# Patient Record
Sex: FEMALE | Race: WHITE | NOT HISPANIC OR LATINO | Employment: OTHER | URBAN - METROPOLITAN AREA
[De-identification: names, ages, dates, MRNs, and addresses within clinical notes are randomized per-mention and may not be internally consistent; named-entity substitution may affect disease eponyms.]

---

## 2023-07-21 LAB
INR IN PPP BY COAGULATION ASSAY: 2.4 (ref 0.9–1.1)
PROTHROMBIN TIME (PT) IN PPP BY COAGULATION ASSAY: 27.2 SEC (ref 9.8–12.8)

## 2023-08-16 LAB
INR IN PPP BY COAGULATION ASSAY: 2.1 (ref 0.9–1.1)
PROTHROMBIN TIME (PT) IN PPP BY COAGULATION ASSAY: 24.3 SEC (ref 9.8–12.8)

## 2023-11-28 ENCOUNTER — OFFICE VISIT (OUTPATIENT)
Dept: CARDIOLOGY | Facility: CLINIC | Age: 78
End: 2023-11-28
Payer: MEDICARE

## 2023-11-28 VITALS
SYSTOLIC BLOOD PRESSURE: 136 MMHG | WEIGHT: 145 LBS | TEMPERATURE: 95.7 F | HEART RATE: 74 BPM | BODY MASS INDEX: 24.16 KG/M2 | DIASTOLIC BLOOD PRESSURE: 50 MMHG | HEIGHT: 65 IN

## 2023-11-28 DIAGNOSIS — R06.02 SHORTNESS OF BREATH ON EXERTION: ICD-10-CM

## 2023-11-28 DIAGNOSIS — I12.9 BENIGN HYPERTENSION WITH CKD (CHRONIC KIDNEY DISEASE) STAGE III (MULTI): ICD-10-CM

## 2023-11-28 DIAGNOSIS — Z95.0 PRESENCE OF CARDIAC PACEMAKER: ICD-10-CM

## 2023-11-28 DIAGNOSIS — I38 ENDOCARDITIS OF PROSTHETIC MITRAL VALVE (CMS-HCC): ICD-10-CM

## 2023-11-28 DIAGNOSIS — F01.50 VASCULAR DEMENTIA, UNSPECIFIED DEMENTIA SEVERITY, UNSPECIFIED WHETHER BEHAVIORAL, PSYCHOTIC, OR MOOD DISTURBANCE OR ANXIETY (MULTI): ICD-10-CM

## 2023-11-28 DIAGNOSIS — I48.0 PAROXYSMAL ATRIAL FIBRILLATION (MULTI): ICD-10-CM

## 2023-11-28 DIAGNOSIS — N18.30 BENIGN HYPERTENSION WITH CKD (CHRONIC KIDNEY DISEASE) STAGE III (MULTI): ICD-10-CM

## 2023-11-28 DIAGNOSIS — Z87.891 FORMER SMOKER: ICD-10-CM

## 2023-11-28 DIAGNOSIS — Z95.3 S/P AORTIC VALVE REPLACEMENT WITH BIOPROSTHETIC VALVE: ICD-10-CM

## 2023-11-28 DIAGNOSIS — R60.0 BILATERAL LEG EDEMA: ICD-10-CM

## 2023-11-28 DIAGNOSIS — T82.6XXA ENDOCARDITIS OF PROSTHETIC MITRAL VALVE (CMS-HCC): ICD-10-CM

## 2023-11-28 DIAGNOSIS — I50.33 ACUTE ON CHRONIC HEART FAILURE WITH PRESERVED EJECTION FRACTION (MULTI): Primary | ICD-10-CM

## 2023-11-28 DIAGNOSIS — Z95.3 S/P MITRAL VALVE REPLACEMENT WITH BIOPROSTHETIC VALVE: ICD-10-CM

## 2023-11-28 PROBLEM — E21.3 HYPERPARATHYROIDISM (MULTI): Status: ACTIVE | Noted: 2023-11-28

## 2023-11-28 PROBLEM — G95.9 CERVICAL MYELOPATHY (MULTI): Status: ACTIVE | Noted: 2023-11-28

## 2023-11-28 PROBLEM — M41.25 OTHER IDIOPATHIC SCOLIOSIS, THORACOLUMBAR REGION: Status: ACTIVE | Noted: 2023-11-28

## 2023-11-28 PROBLEM — M48.061 LUMBAR CANAL STENOSIS: Status: ACTIVE | Noted: 2023-11-28

## 2023-11-28 PROBLEM — G45.9 TIA (TRANSIENT ISCHEMIC ATTACK): Status: ACTIVE | Noted: 2023-11-28

## 2023-11-28 PROBLEM — I25.10 CAD (CORONARY ARTERY DISEASE): Status: ACTIVE | Noted: 2023-11-28

## 2023-11-28 PROBLEM — N30.90 CYSTITIS: Status: ACTIVE | Noted: 2023-11-28

## 2023-11-28 PROBLEM — E78.2 MIXED HYPERLIPIDEMIA: Status: ACTIVE | Noted: 2023-11-28

## 2023-11-28 PROBLEM — T46.4X5A ACE-INHIBITOR COUGH: Status: ACTIVE | Noted: 2023-11-28

## 2023-11-28 PROBLEM — E03.9 HYPOTHYROIDISM: Status: ACTIVE | Noted: 2023-11-28

## 2023-11-28 PROBLEM — I50.30 (HFPEF) HEART FAILURE WITH PRESERVED EJECTION FRACTION (MULTI): Status: ACTIVE | Noted: 2023-11-28

## 2023-11-28 PROBLEM — E55.9 VITAMIN D DEFICIENCY: Status: ACTIVE | Noted: 2023-11-28

## 2023-11-28 PROBLEM — R05.8 ACE-INHIBITOR COUGH: Status: ACTIVE | Noted: 2023-11-28

## 2023-11-28 PROBLEM — E61.1 IRON DEFICIENCY: Status: ACTIVE | Noted: 2023-11-28

## 2023-11-28 PROBLEM — K21.9 GERD (GASTROESOPHAGEAL REFLUX DISEASE): Status: ACTIVE | Noted: 2023-11-28

## 2023-11-28 PROBLEM — Z86.79 H/O SICK SINUS SYNDROME: Status: ACTIVE | Noted: 2023-11-28

## 2023-11-28 PROBLEM — I50.9 CHF (CONGESTIVE HEART FAILURE) (MULTI): Status: ACTIVE | Noted: 2023-11-28

## 2023-11-28 PROBLEM — K92.1 MELENA: Status: ACTIVE | Noted: 2023-11-28

## 2023-11-28 PROBLEM — F32.A ANXIETY AND DEPRESSION: Status: ACTIVE | Noted: 2023-11-28

## 2023-11-28 PROBLEM — H91.93 HEARING LOSS, BILATERAL: Status: ACTIVE | Noted: 2023-11-28

## 2023-11-28 PROBLEM — M13.0 POLYARTHRITIS: Status: ACTIVE | Noted: 2023-11-28

## 2023-11-28 PROBLEM — R39.15 URINARY URGENCY: Status: ACTIVE | Noted: 2023-11-28

## 2023-11-28 PROBLEM — I65.29 CAROTID STENOSIS, ASYMPTOMATIC: Status: ACTIVE | Noted: 2023-11-28

## 2023-11-28 PROBLEM — F41.9 ANXIETY AND DEPRESSION: Status: ACTIVE | Noted: 2023-11-28

## 2023-11-28 PROBLEM — G47.33 OBSTRUCTIVE SLEEP APNEA: Status: ACTIVE | Noted: 2023-11-28

## 2023-11-28 PROBLEM — I47.19 ATRIAL TACHYCARDIA (CMS-HCC): Status: ACTIVE | Noted: 2023-11-28

## 2023-11-28 PROCEDURE — 99215 OFFICE O/P EST HI 40 MIN: CPT | Performed by: INTERNAL MEDICINE

## 2023-11-28 PROCEDURE — 1036F TOBACCO NON-USER: CPT | Performed by: INTERNAL MEDICINE

## 2023-11-28 PROCEDURE — 3078F DIAST BP <80 MM HG: CPT | Performed by: INTERNAL MEDICINE

## 2023-11-28 PROCEDURE — 1160F RVW MEDS BY RX/DR IN RCRD: CPT | Performed by: INTERNAL MEDICINE

## 2023-11-28 PROCEDURE — 1159F MED LIST DOCD IN RCRD: CPT | Performed by: INTERNAL MEDICINE

## 2023-11-28 PROCEDURE — 3075F SYST BP GE 130 - 139MM HG: CPT | Performed by: INTERNAL MEDICINE

## 2023-11-28 RX ORDER — WARFARIN SODIUM 5 MG/1
5 TABLET ORAL ONCE
COMMUNITY
Start: 2016-08-01

## 2023-11-28 RX ORDER — SPIRONOLACTONE 25 MG/1
25 TABLET ORAL
COMMUNITY
End: 2024-02-12 | Stop reason: SDUPTHER

## 2023-11-28 RX ORDER — METOPROLOL SUCCINATE 50 MG/1
50 TABLET, EXTENDED RELEASE ORAL
COMMUNITY
Start: 2018-07-27

## 2023-11-28 RX ORDER — LEVOTHYROXINE SODIUM 100 UG/1
112 TABLET ORAL
COMMUNITY
Start: 2023-09-25

## 2023-11-28 RX ORDER — AMOXICILLIN 500 MG/1
250 CAPSULE ORAL EVERY 12 HOURS SCHEDULED
COMMUNITY
Start: 2021-11-22 | End: 2024-02-26 | Stop reason: WASHOUT

## 2023-11-28 RX ORDER — POTASSIUM CHLORIDE 1500 MG/1
20 TABLET, EXTENDED RELEASE ORAL DAILY
COMMUNITY
Start: 2022-03-28

## 2023-11-28 RX ORDER — TORSEMIDE 20 MG/1
40 TABLET ORAL DAILY
COMMUNITY
Start: 2022-03-25 | End: 2024-02-12 | Stop reason: SDUPTHER

## 2023-11-28 NOTE — PROGRESS NOTES
Patient:  Nemo Vegas  YOB: 1945  MRN: [id number]       Chief Complaint/Active Symptoms:       Nemo Vegas is a 78 y.o. female who returns today for cardiac follow-up.    Patient now at the [facility] in [place]. Went to  recently with HFpEF. Was treated earlier for MV endocarditis with 2  episodes of 4-6 weeks of antibiotic therapy. Now returns to our practice as they want a fingerstick PT/INR for her long-term for her warfarin management. Patient has been hospitalized twice for HFpEF at  hospital. Last echo in her chart is from 7/2023. Patients son who brought her to the clinic states one was done at  but that is not seen in the current record. Seen by Shady at [facility] in September and again in October     Patient had very mild edema on her legs when DC 11/19 per the family - progressively worsening since her DC. Her son states that they doubled her diuretic therapy post-discharge and added spironolactone. He is uncertain whether her LE edema has improved since those changes. He knows that a low sodium diet was ordered but is not sure whether it is being followed. The patient has support stockings but he states the staff do not put them on. They bought a ottoman for her to put her feet up but they are not sure with her dementia that she remembers to do so.     They have stopped her Calquence for her CLL ([facility]) due to some concerns of interfering with her antibiotics. There is some plan for possible restarting in January after seeing the ID specialist. She is currently on oral antibiotic therapy to complete for her MV endocarditis. Son states 2 different organisms grew - ID is managing her therapy.     No notes, labs, med list from SNF provided by family - a hand typed current med list seen by nursing staff. No order sheets from her SNF provided.     Review of Systems   Unable to perform ROS: Dementia   Patient has no complaints when asked other than leg edema and shortness of  breath.     Objective:     Vitals:    11/28/23 1106   BP: 136/50   Pulse: 74   Temp: 35.4 °C (95.7 °F)       Vitals:    11/28/23 1106   Weight: 65.8 kg (145 lb)       Allergies:     Allergies   Allergen Reactions    Vancomycin Anaphylaxis    Adhesive Tape-Silicones Unknown     SKIN IRRITANT    Penicillins Nausea Only and Unknown     N/V          Medications:     Current Outpatient Medications   Medication Instructions    amoxicillin (AMOXIL) 250 mg, oral, Every 12 hours scheduled    Klor-Con M20 20 mEq ER tablet 20 mEq, oral, Daily    levothyroxine (SYNTHROID, LEVOXYL) 100 mcg, oral, Daily RT    metoprolol succinate XL (TOPROL-XL) 50 mg, oral, Daily RT    spironolactone (ALDACTONE) 25 mg, oral, Daily RT    torsemide (DEMADEX) 20 mg, oral, 2 times daily    warfarin (COUMADIN) 5 mg, oral, Once       Physical Examination:   GENERAL:  Well developed, well nourished, in no acute distress.  HEENT: NC AT, EOMI with anicteric sclera  NECK:  Reduced ROM, no JVD at 90 degrees, no bruit.  CHEST:  Symmetric and nontender.  LUNGS:  Nonlabored respirations, diminished at the bases with mild scattered rhonchi and a few right basilar rales that mostly clear with cough.   HEART:  PMI is nonpalpable. RR with normal S1 and S2, no appreciable S3. Soft ROSELYN USB, mild diastolic decrescendo murmur LUSB and cardiac apex. Patient has bilateral 3+ waxy edema of the legs below the knees, trace posterior dependent edema above the knee on the right.   ABDOMEN: Soft, NT, ND without  bruits. Liver edge down 1 cm RCM  EXTREMITIES:  Warm with good color, no clubbing or cyanosis.  There is 3+ edema noted.  MUSCULOSKELETAL: arthritic changes - patient in wheelchair  NEURO/PSYCH:  Alert and oriented times one with approppriate behavior. Nonfocal motor examination, gait not assessed - in wheelchair  Lymph: No significant palpable lymphadenopathy  Skin: no rash or lesions on exposed skin or reported.    Lab:     CBC:   Lab Results   Component Value Date  "   WBC 9.6 07/05/2022    RBC 4.45 07/05/2022    HGB 12.7 07/05/2022    HCT 39.9 07/05/2022     07/05/2022        CMP:    Lab Results   Component Value Date     07/05/2022    K 3.8 07/05/2022     07/05/2022    CO2 30 07/05/2022    BUN 25 (H) 07/05/2022    CREATININE 0.87 07/05/2022    GLUCOSE 99 07/05/2022    CALCIUM 9.2 07/05/2022       Magnesium:    Lab Results   Component Value Date    MG 2.40 07/05/2022       Lipid Profile:    Lab Results   Component Value Date    TRIG 280 (H) 04/05/2021    HDL 50.0 04/05/2021       TSH:    Lab Results   Component Value Date    TSH 25.41 (H) 04/05/2021       BNP:   Lab Results   Component Value Date    BNP 97 07/05/2022        PT/INR:    Lab Results   Component Value Date    PROTIME 24.3 (H) 08/16/2023    INR 2.1 (H) 08/16/2023       HgBA1c:    Lab Results   Component Value Date    HGBA1C 5.4 01/04/2022       BMP:  Lab Results   Component Value Date     07/05/2022     08/11/2021     04/05/2021    K 3.8 07/05/2022    K 4.0 08/11/2021    K 4.3 04/05/2021     07/05/2022     08/11/2021    CL 99 04/05/2021    CO2 30 07/05/2022    CO2 31 08/11/2021    CO2 33 (H) 04/05/2021    BUN 25 (H) 07/05/2022    BUN 30 (H) 08/11/2021    BUN 29 (H) 04/05/2021    CREATININE 0.87 07/05/2022    CREATININE 1.37 (H) 08/11/2021    CREATININE 1.22 (H) 04/05/2021     Labs from 11/19/23 at  showed albumin 3, cr 1.11, K 3.9, bicarb 32. Hgb 9.2, hematocrit 29.2    Cardiac Enzymes:    No results found for: \"TROPHS\"    Hepatic Function Panel:    Lab Results   Component Value Date    ALKPHOS 82 07/05/2022    ALT 5 (L) 07/05/2022    AST 20 07/05/2022    PROT 6.6 07/05/2022    BILITOT 0.9 07/05/2022         Diagnostic Studies:     No echocardiogram results found for the past 12 months  Echo 7/2022 - EF 60-65%, AV gradient 9 mm Hg with trace AI, historically a 21 mm bioprosthetic AV, mitral bioprosthesis - 25 mm size without MR at that time. Trace TR seen. " "Paradoxical septal motion, diastolic filling indeterminate.     No nuclear medicine results found for the past 12 months    No valid procedures specified.    EKG:   No results found for: \"EKG\"    Radiology:     No orders to display   CXR dated 11/19/2023 at [facility] showed diffuse interstitial infiltrates and small bilateral pleural effusions. Mildly improved from 11/16/23 CXR AT       ASSESSMENT     Problem List Items Addressed This Visit       Benign hypertension with CKD (chronic kidney disease) stage III (CMS/HCC)    Paroxysmal atrial fibrillation (CMS/HCC)    Relevant Medications    metoprolol succinate XL (Toprol-XL) 50 mg 24 hr tablet    Presence of cardiac pacemaker    Shortness of breath on exertion    S/P aortic valve replacement with bioprosthetic valve    Vascular dementia (CMS/HCC)    (HFpEF) heart failure with preserved ejection fraction (CMS/HCC) - Primary    Relevant Medications    metoprolol succinate XL (Toprol-XL) 50 mg 24 hr tablet    Bilateral leg edema    S/P mitral valve replacement with bioprosthetic valve    Endocarditis of prosthetic mitral valve (CMS/HCC)    Relevant Medications    metoprolol succinate XL (Toprol-XL) 50 mg 24 hr tablet    Former smoker       PLAN     Acute on chronic HFpEF. Patient with signs of volume overload. Diuretic therapy being managed by her SNF staff / CNP but no order sheets or labs looking at renal function post-DC. Given last labs show some volume contraction with elevated bicab would start with compressive stockings or ACE wraps to mobilized the edema in her lower legs and make sure she is on a low sodium diet. Explained to the family that management of her diuretic therapy is best done by the medical staff at her SNF as we are removed, cannot see the patient fequently, and do not have access to labs and medication changes. Encouraged him to discuss status with the nursing manager and the CNP who made the initial adjustments in her therapy.   Edema - see " above  Mitral valve endocarditis / bioprosthetic AV and MV. Seeing ID - defer to them  Paroxysmal atrial fibrillation / warfarin anticoagulation. Patient wants to start back up her finger stick PT/INR and that is the only reason he came back to see us. Currently she gets labs drawn at her SNF and sent to [facility] labs and they have to wait 2-3 days for results. While we can recommend the PT/INR home machine - these would need to be managed by the physicians and staff at her SNF. Patients POA was noncompliant with management via the coumadin clinic at  prior to her being placed in an extended care facility - he would either not call in the labs or would not adjust her medications as instructed to do so. Will order the INR but will not manage the patients warfarin dose or refer to the  cardiology clinic. Patients POA was instructed that the medical staff at her SNF will need to continue to manage this medication.   Hypertension. BP controlled today.  Advanced dementia - patient oriented x 1. Son who is POA makes patient decisions.     Will follow prn. Patient being seen by EP here for her device management. As she will continue to be admitted to [facility] hospital when any issues suggested the establish with the cardiologist there. Will continue to provide support as needed. Limitations given her current situation discussed with the POA.

## 2023-12-11 ENCOUNTER — HOSPITAL ENCOUNTER (OUTPATIENT)
Dept: CARDIOLOGY | Facility: HOSPITAL | Age: 78
Discharge: HOME | End: 2023-12-11
Payer: MEDICARE

## 2023-12-11 DIAGNOSIS — Z95.0 CARDIAC PACEMAKER IN SITU: Primary | ICD-10-CM

## 2023-12-11 DIAGNOSIS — I49.5 SINOATRIAL NODE DYSFUNCTION (MULTI): ICD-10-CM

## 2023-12-11 DIAGNOSIS — Z95.0 CARDIAC PACEMAKER IN SITU: ICD-10-CM

## 2023-12-11 PROCEDURE — 93294 REM INTERROG EVL PM/LDLS PM: CPT | Performed by: INTERNAL MEDICINE

## 2023-12-11 PROCEDURE — 93296 REM INTERROG EVL PM/IDS: CPT

## 2024-02-08 RX ORDER — CALCIUM CARBONATE 300MG(750)
400 TABLET,CHEWABLE ORAL DAILY
COMMUNITY

## 2024-02-08 RX ORDER — BISMUTH SUBSALICYLATE 262 MG
2 TABLET,CHEWABLE ORAL DAILY
COMMUNITY
End: 2024-02-26 | Stop reason: WASHOUT

## 2024-02-12 ENCOUNTER — ANCILLARY PROCEDURE (OUTPATIENT)
Dept: CARDIOLOGY | Facility: CLINIC | Age: 79
End: 2024-02-12
Payer: MEDICARE

## 2024-02-12 ENCOUNTER — OFFICE VISIT (OUTPATIENT)
Dept: CARDIOLOGY | Facility: CLINIC | Age: 79
End: 2024-02-12
Payer: MEDICARE

## 2024-02-12 VITALS
DIASTOLIC BLOOD PRESSURE: 50 MMHG | TEMPERATURE: 97.2 F | HEIGHT: 65 IN | SYSTOLIC BLOOD PRESSURE: 122 MMHG | HEART RATE: 83 BPM | BODY MASS INDEX: 25.96 KG/M2 | WEIGHT: 155.8 LBS

## 2024-02-12 DIAGNOSIS — Z95.0 CARDIAC PACEMAKER IN SITU: ICD-10-CM

## 2024-02-12 DIAGNOSIS — E78.2 MIXED HYPERLIPIDEMIA: ICD-10-CM

## 2024-02-12 DIAGNOSIS — Z86.79 H/O SICK SINUS SYNDROME: ICD-10-CM

## 2024-02-12 DIAGNOSIS — T82.6XXA ENDOCARDITIS OF PROSTHETIC MITRAL VALVE (CMS-HCC): ICD-10-CM

## 2024-02-12 DIAGNOSIS — I49.5 SINOATRIAL NODE DYSFUNCTION (MULTI): ICD-10-CM

## 2024-02-12 DIAGNOSIS — Z95.3 S/P MITRAL VALVE REPLACEMENT WITH BIOPROSTHETIC VALVE: ICD-10-CM

## 2024-02-12 DIAGNOSIS — I65.23 ASYMPTOMATIC BILATERAL CAROTID ARTERY STENOSIS: ICD-10-CM

## 2024-02-12 DIAGNOSIS — I12.9 BENIGN HYPERTENSION WITH CKD (CHRONIC KIDNEY DISEASE) STAGE III (MULTI): ICD-10-CM

## 2024-02-12 DIAGNOSIS — Z95.3 S/P AORTIC VALVE REPLACEMENT WITH BIOPROSTHETIC VALVE: ICD-10-CM

## 2024-02-12 DIAGNOSIS — I48.0 PAROXYSMAL ATRIAL FIBRILLATION (MULTI): ICD-10-CM

## 2024-02-12 DIAGNOSIS — I38 ENDOCARDITIS OF PROSTHETIC MITRAL VALVE (CMS-HCC): ICD-10-CM

## 2024-02-12 DIAGNOSIS — Z95.0 PRESENCE OF CARDIAC PACEMAKER: ICD-10-CM

## 2024-02-12 DIAGNOSIS — I50.32 CHRONIC HEART FAILURE WITH PRESERVED EJECTION FRACTION (MULTI): ICD-10-CM

## 2024-02-12 DIAGNOSIS — I25.10 CORONARY ARTERY DISEASE INVOLVING NATIVE CORONARY ARTERY OF NATIVE HEART WITHOUT ANGINA PECTORIS: ICD-10-CM

## 2024-02-12 DIAGNOSIS — Z87.891 FORMER SMOKER: ICD-10-CM

## 2024-02-12 DIAGNOSIS — N18.30 BENIGN HYPERTENSION WITH CKD (CHRONIC KIDNEY DISEASE) STAGE III (MULTI): ICD-10-CM

## 2024-02-12 PROCEDURE — 93290 INTERROG DEV EVAL ICPMS IP: CPT | Performed by: INTERNAL MEDICINE

## 2024-02-12 PROCEDURE — 93000 ELECTROCARDIOGRAM COMPLETE: CPT | Performed by: INTERNAL MEDICINE

## 2024-02-12 PROCEDURE — 1036F TOBACCO NON-USER: CPT | Performed by: INTERNAL MEDICINE

## 2024-02-12 PROCEDURE — 93280 PM DEVICE PROGR EVAL DUAL: CPT | Performed by: INTERNAL MEDICINE

## 2024-02-12 PROCEDURE — 99215 OFFICE O/P EST HI 40 MIN: CPT | Performed by: INTERNAL MEDICINE

## 2024-02-12 PROCEDURE — 3074F SYST BP LT 130 MM HG: CPT | Performed by: INTERNAL MEDICINE

## 2024-02-12 PROCEDURE — 3078F DIAST BP <80 MM HG: CPT | Performed by: INTERNAL MEDICINE

## 2024-02-12 PROCEDURE — 1159F MED LIST DOCD IN RCRD: CPT | Performed by: INTERNAL MEDICINE

## 2024-02-12 RX ORDER — TORSEMIDE 20 MG/1
20 TABLET ORAL DAILY
Qty: 1 TABLET | Refills: 0 | OUTPATIENT
Start: 2024-02-12 | End: 2024-02-26 | Stop reason: SDUPTHER

## 2024-02-12 RX ORDER — POLYETHYLENE GLYCOL 3350 17 G/17G
17 POWDER, FOR SOLUTION ORAL DAILY
COMMUNITY

## 2024-02-12 RX ORDER — HYDROXYZINE HYDROCHLORIDE 25 MG/1
25 TABLET, FILM COATED ORAL EVERY 12 HOURS
COMMUNITY

## 2024-02-12 RX ORDER — SPIRONOLACTONE 25 MG/1
TABLET ORAL
Qty: 1 TABLET | Refills: 0 | OUTPATIENT
Start: 2024-02-12

## 2024-02-12 RX ORDER — LEVOTHYROXINE SODIUM 112 UG/1
112 TABLET ORAL
COMMUNITY
Start: 2023-12-22 | End: 2024-02-26 | Stop reason: WASHOUT

## 2024-02-12 RX ORDER — ALBUTEROL SULFATE 0.83 MG/ML
2.5 SOLUTION RESPIRATORY (INHALATION) EVERY 6 HOURS PRN
COMMUNITY
End: 2024-02-26 | Stop reason: WASHOUT

## 2024-02-12 RX ORDER — AMMONIUM LACTATE 12 G/100G
LOTION TOPICAL AS NEEDED
COMMUNITY
Start: 2024-01-24 | End: 2024-02-26 | Stop reason: WASHOUT

## 2024-02-12 RX ORDER — ACETAMINOPHEN 325 MG/1
650 TABLET ORAL EVERY 4 HOURS PRN
COMMUNITY
End: 2024-02-26 | Stop reason: WASHOUT

## 2024-02-12 RX ORDER — IBUPROFEN 100 MG/5ML
1000 SUSPENSION, ORAL (FINAL DOSE FORM) ORAL DAILY
COMMUNITY

## 2024-02-12 ASSESSMENT — ENCOUNTER SYMPTOMS
CONSTITUTIONAL NEGATIVE: 1
NEUROLOGICAL NEGATIVE: 1
RESPIRATORY NEGATIVE: 1
CARDIOVASCULAR NEGATIVE: 1

## 2024-02-12 NOTE — PROGRESS NOTES
Chief Complaint:   Follow-up     History Of Present Illness:    Nemo Vegas is a 78 y.o. female presenting with follow-up.  She has no arrhythmia symptoms device pocket is well-healed.    She is accompanied by her brother.  He reports that she is now in assisted living.  She underwent to IV courses antibiotics for endocarditis at the [facility].  Last echocardiogram from October 2023 showed no signs of endocarditis.  LV function is normal.    Last blood work reviewed.  Ordered to hold spironolactone until seen by cardiology written in chart.  Also decrease torsemide to 20 mg daily.  She has bilateral lower extremity edema.  Order for compression stockings written in chart.    Last Recorded Vitals:  Vitals:    02/12/24 1320   BP: 122/50   Pulse: 83   Temp: 36.2 °C (97.2 °F)         Past Medical History:  See list  He set up the remote monitor at her assisted living facility recently.  Discussed how the transmission gets to the [place] device clinic and pacer and that is then placed into epic    Past Surgical History:  See list  Social History:  She reports that she quit smoking about 15 years ago. Her smoking use included cigarettes. She has never used smokeless tobacco. She reports that she does not currently use alcohol. She reports that she does not currently use drugs.    Family History:  Family History   Problem Relation Name Age of Onset    Other (No pertinent family history) Mother      Other (Silent myocardial infarction) Father          Allergies:  Vancomycin, Adhesive tape-silicones, and Penicillins    Outpatient Medications:  Current Outpatient Medications   Medication Instructions    acalabrutinib (CALQUENCE ORAL) 100 mg, oral, 2 times daily, TAKE 1 TABLET TWICE DAILY    amoxicillin (AMOXIL) 250 mg, oral, Every 12 hours scheduled    cholecalciferol, vitamin D3, (VITAMIN D3 ORAL) 25 mcg, oral, Daily, Vitamin D3 25 MCG (1000 UT) Oral Capsule;  Take 1 capsule PO daily    Klor-Con M20 20 mEq ER tablet  20 mEq, oral, Daily    levothyroxine (SYNTHROID, LEVOXYL) 100 mcg, oral, Daily RT    magnesium oxide (MAG-OX) 400 mg, oral, Daily    metoprolol succinate XL (TOPROL-XL) 50 mg, oral, Daily RT    multivitamin (MULTIPLE VITAMINS ORAL) 1 tablet, oral, Daily    multivitamin tablet 2 tablets, oral, Daily, Vitamin C 1000 MG Oral Tablet;  TAKE 2 TABLETS DAILY    spironolactone (ALDACTONE) 25 mg, oral, Daily RT    torsemide (DEMADEX) 20 mg, oral, 2 times daily    warfarin (COUMADIN) 5 mg, oral, Once   Review of Systems   Constitutional: Negative.   Cardiovascular: Negative.    Respiratory: Negative.     Neurological: Negative.    All other systems reviewed and are negative.        Physical Exam:  Physical Exam  Cardiovascular:      Rate and Rhythm: Normal rate.      Pulses: Normal pulses.      Heart sounds: Normal heart sounds.   Pulmonary:      Effort: Pulmonary effort is normal.   Neurological:      General: No focal deficit present.      Mental Status: She is alert.            Last Labs:    Also see [facility] everywhere for blood work from October 2023    CBC -  Lab Results   Component Value Date    WBC 9.6 07/05/2022    HGB 12.7 07/05/2022    HCT 39.9 07/05/2022    MCV 90 07/05/2022     07/05/2022       CMP -  Lab Results   Component Value Date    CALCIUM 9.2 07/05/2022    PHOS 2.7 10/08/2020    PROT 6.6 07/05/2022    ALBUMIN 4.2 07/05/2022    AST 20 07/05/2022    ALT 5 (L) 07/05/2022    ALKPHOS 82 07/05/2022    BILITOT 0.9 07/05/2022       LIPID PANEL -   Lab Results   Component Value Date    CHOL 354 (H) 04/05/2021    TRIG 280 (H) 04/05/2021    HDL 50.0 04/05/2021    CHHDL 7.1 (A) 04/05/2021    LDLF 248 (H) 04/05/2021    VLDL 56 (H) 04/05/2021    NHDL 304 04/05/2021       RENAL FUNCTION PANEL -   Lab Results   Component Value Date    GLUCOSE 99 07/05/2022     07/05/2022    K 3.8 07/05/2022     07/05/2022    CO2 30 07/05/2022    ANIONGAP 12 07/05/2022    BUN 25 (H) 07/05/2022    CREATININE 0.87 07/05/2022     CALCIUM 9.2 07/05/2022    PHOS 2.7 10/08/2020    ALBUMIN 4.2 07/05/2022        Lab Results   Component Value Date    BNP 97 07/05/2022    HGBA1C 5.4 01/04/2022       Last Cardiology Tests:  ECG:    ECG today.  Possible ectopic atrial rhythm.  Appropriate pacing. Left axis deviation. Corrected QT interval 500 ms. Right bundle branch block. Left anterior fascicular block.     Device check today. Medtronic ADD RL 1 Adapta pacemaker. Estimate longevity device 8 years.  Less than 0.1 A. fib. 0 VT. 98% atrial pacing. 0.2% ventricular pacing.         Lab review: I have personally reviewed the laboratory result(s) see above    Assessment/Plan   Problem List Items Addressed This Visit       Benign hypertension with CKD (chronic kidney disease) stage III (CMS/[facility])    CAD (coronary artery disease)    Carotid stenosis, asymptomatic    H/O sick sinus syndrome    Mixed hyperlipidemia    Paroxysmal atrial fibrillation (CMS/[facility])    Presence of cardiac pacemaker    S/P aortic valve replacement with bioprosthetic valve    (HFpEF) heart failure with preserved ejection fraction (CMS/[facility])    S/P mitral valve replacement with bioprosthetic valve    Endocarditis of prosthetic mitral valve (CMS/[facility])    Former smoker    BMI 25.0-25.9,adult       Chronotropic incompetence, sinus node dysfunction, symptomatic bradycardia with near syncope. Status post pacemaker 2010 and generator change 2018. Chronic. Stable. Near syncope resolved after pacemaker implanted.  Medtronic ADD RL 1 Adapta pacemaker. Chronic. Stable.  Initiated remote monitors as patient has an assisted living.  Alternate remote monitors with device clinic paired with EP office visit  Chronic elevation in RV threshold. Adjusted amplitude. Capture management at monitoring only. Stable.  Has existing epicardial LV lead Requested device clinic to reevaluate lead data in past and evaluate for any LV lead by [employer] review, however, it remains unlisted. No other information found in  chart.  The patient's son and I discussed that there is a possibility with endocarditis that some infection may be present on a retained lead.  The patient and 2 family members discussed with me that she is not a candidate for extraction or surgery per her request and per evaluation at outside facility.  Near syncope. Chronic. Stable, with no recurrence after pacemaker implanted.  Paroxysmal atrial fibrillation and atrial tachycardia. Chronic. Stable. Reviewed medications. Continue warfarin. Continue metoprolol and magnesium. Continue medications.  Refills discussed  Prolonged QT.  Decrease torsemide.  History of aortic and mitral valve bioprostheses. Warfarin. High risk medication. Discussed anticoagulation  Hypertension, benign, chronic, controlled. Stable. Reviewed medications. Continue meds. Refills discussed. Also discussed compliance with medications.  Coronary artery disease. Chronic. Stable. Reviewed medications. Continue medications. Refills.  CHF. Stable. Reviewed medications.  Reviewed last echocardiogram.  Adjusted medication.  Further adjustment by cardiology in future.  Hyperlipidemia. Chronic. Stable.  Discussed refill CKD stage III. Chronic. Stable. Reviewed medications. Follows with renal.   Venous insufficiency. Stable. Chronic.  Order for compression stockings placed in chart.  Hypothyroidism on thyroid replacement. Chronic. Stable. Reviewed medication. On thyroid replacement.  Bilateral foot pain. Chronic. Follows with PCP  Overweight.  Dementia. Her brother closely follows her medications.  AHA recommendations for exercise, diet, and behavioral modification reviewed with pt.     The patient, family members, and I discussed the mechanism of arrhythmia, pacemaker, pacemaker check, ECG, device clinic, device longevity, diuretic, compression stockings, last available blood work in epic everywhere, discussion if and what medication refills needed, treatment options, risks, benefits, and  imponderables. American Heart Association lifestyle changes and behavioral modification discussed. All questions answered in detail. Counseling over 50% visit regarding above. Patient appreciative of care.        Jodee Duque MD

## 2024-02-12 NOTE — PATIENT INSTRUCTIONS
Wear compression daily on am and off pm  Hold spironolactone until seen by Dr Turcios in a few weeks.2 /26/24

## 2024-02-14 ENCOUNTER — PATIENT MESSAGE (OUTPATIENT)
Dept: CARDIOLOGY | Facility: CLINIC | Age: 79
End: 2024-02-14
Payer: MEDICARE

## 2024-02-14 DIAGNOSIS — R60.0 LOCALIZED EDEMA: Primary | ICD-10-CM

## 2024-02-19 ENCOUNTER — TELEPHONE (OUTPATIENT)
Dept: CARDIOLOGY | Facility: CLINIC | Age: 79
End: 2024-02-19
Payer: MEDICARE

## 2024-02-19 NOTE — TELEPHONE ENCOUNTER
I  called Jerel Chandler at [facility]. She was not available. I waited for several minutes and no one came to phone. I had to hang up as I was rounding with Dr Duque. Will try later.

## 2024-02-26 ENCOUNTER — OFFICE VISIT (OUTPATIENT)
Dept: CARDIOLOGY | Facility: CLINIC | Age: 79
End: 2024-02-26
Payer: MEDICARE

## 2024-02-26 ENCOUNTER — TELEPHONE (OUTPATIENT)
Dept: PRIMARY CARE | Facility: CLINIC | Age: 79
End: 2024-02-26

## 2024-02-26 VITALS
TEMPERATURE: 97.3 F | WEIGHT: 165 LBS | SYSTOLIC BLOOD PRESSURE: 140 MMHG | HEIGHT: 65 IN | HEART RATE: 72 BPM | BODY MASS INDEX: 27.49 KG/M2 | DIASTOLIC BLOOD PRESSURE: 50 MMHG

## 2024-02-26 DIAGNOSIS — Z95.3 S/P AORTIC VALVE REPLACEMENT WITH BIOPROSTHETIC VALVE: ICD-10-CM

## 2024-02-26 DIAGNOSIS — I38 ENDOCARDITIS OF PROSTHETIC MITRAL VALVE (CMS-HCC): ICD-10-CM

## 2024-02-26 DIAGNOSIS — R06.02 SHORTNESS OF BREATH ON EXERTION: ICD-10-CM

## 2024-02-26 DIAGNOSIS — I50.32 CHRONIC HEART FAILURE WITH PRESERVED EJECTION FRACTION (MULTI): Primary | ICD-10-CM

## 2024-02-26 DIAGNOSIS — Z95.3 S/P MITRAL VALVE REPLACEMENT WITH BIOPROSTHETIC VALVE: ICD-10-CM

## 2024-02-26 DIAGNOSIS — Z95.0 PRESENCE OF CARDIAC PACEMAKER: ICD-10-CM

## 2024-02-26 DIAGNOSIS — T82.6XXA ENDOCARDITIS OF PROSTHETIC MITRAL VALVE (CMS-HCC): ICD-10-CM

## 2024-02-26 DIAGNOSIS — I12.9 BENIGN HYPERTENSION WITH CKD (CHRONIC KIDNEY DISEASE) STAGE III (MULTI): ICD-10-CM

## 2024-02-26 DIAGNOSIS — N18.30 BENIGN HYPERTENSION WITH CKD (CHRONIC KIDNEY DISEASE) STAGE III (MULTI): ICD-10-CM

## 2024-02-26 DIAGNOSIS — Z87.891 FORMER SMOKER: ICD-10-CM

## 2024-02-26 PROCEDURE — 99214 OFFICE O/P EST MOD 30 MIN: CPT | Performed by: INTERNAL MEDICINE

## 2024-02-26 PROCEDURE — 1036F TOBACCO NON-USER: CPT | Performed by: INTERNAL MEDICINE

## 2024-02-26 PROCEDURE — 1159F MED LIST DOCD IN RCRD: CPT | Performed by: INTERNAL MEDICINE

## 2024-02-26 PROCEDURE — 3077F SYST BP >= 140 MM HG: CPT | Performed by: INTERNAL MEDICINE

## 2024-02-26 PROCEDURE — 3078F DIAST BP <80 MM HG: CPT | Performed by: INTERNAL MEDICINE

## 2024-02-26 PROCEDURE — 1160F RVW MEDS BY RX/DR IN RCRD: CPT | Performed by: INTERNAL MEDICINE

## 2024-02-26 ASSESSMENT — ENCOUNTER SYMPTOMS
CONFUSION: 1
FATIGUE: 1
WOUND: 1
ACTIVITY CHANGE: 1
SHORTNESS OF BREATH: 1
ABDOMINAL DISTENTION: 1
PALPITATIONS: 0

## 2024-02-26 NOTE — PROGRESS NOTES
Patient:  Nemo Vegas  YOB: 1945  MRN: [id number]       Chief Complaint/Active Symptoms:       Nemo Vegas is a 78 y.o. female who returns today for cardiac follow-up.    Patient here after a long hiatus. Has been admitted and seen at [facility] with concerns for endocarditis. Recent cultures and echos have been negative with normal heart function and normal mitral and aortic prosthetic valves.     Currently living at an Assisted [facility]. Goes to [facility] when ill.     Has laceration and drainage on right ankle that now is draining more purulent material     Review of Systems   Constitutional:  Positive for activity change and fatigue.   Respiratory:  Positive for shortness of breath.    Cardiovascular:  Positive for leg swelling. Negative for chest pain and palpitations.   Gastrointestinal:  Positive for abdominal distention.   Skin:  Positive for wound.   Psychiatric/Behavioral:  Positive for confusion.    All other systems reviewed and are negative.    Objective:     Vitals:    02/26/24 0958   BP: 140/50   Pulse: 72   Temp: 36.3 °C (97.3 °F)       Vitals:    02/26/24 0958   Weight: 74.8 kg (165 lb)       Allergies:     Allergies   Allergen Reactions    Vancomycin Anaphylaxis    Adhesive Tape-Silicones Unknown     SKIN IRRITANT    Penicillins Nausea Only and Unknown     N/V          Medications:     Current Outpatient Medications   Medication Instructions    ascorbic acid (VITAMIN C) 1,000 mg, oral, Daily    BISA-LAX, BISACODYL, RECT rectal    cholecalciferol, vitamin D3, (VITAMIN D3 ORAL) 25 mcg, oral, Daily, Vitamin D3 25 MCG (1000 UT) Oral Capsule;  Take 1 capsule PO daily    guaifenesin/phenylephrine HCl (MUCINEX COLD ORAL) oral    hydrOXYzine HCL (ATARAX) 25 mg, oral, Every 12 hours    Klor-Con M20 20 mEq ER tablet 20 mEq, oral, Daily    levothyroxine (SYNTHROID, LEVOXYL) 112 mcg, oral, Daily RT    mag hydrox/aluminum hyd/simeth (WANDA-LANTA ORAL) 30 mL, oral    magnesium  hydroxide (MILK OF MAGNESIA ORAL) oral    magnesium oxide (MAG-OX) 400 mg, oral, Daily    metoprolol succinate XL (TOPROL-XL) 50 mg, oral, Daily RT    multivitamin (MULTIPLE VITAMINS ORAL) 1 tablet, oral, Daily    polyethylene glycol (GLYCOLAX, MIRALAX) 17 g, oral, Daily    spironolactone (Aldactone) 25 mg tablet Hold  until patient sees Dr Turcios later this month    torsemide (DEMADEX) 20 mg, oral, Daily    warfarin (COUMADIN) 5 mg, oral, Once       Physical Examination:   GENERAL:  Well developed, well nourished, in no acute distress.  HEENT: NC AT, EOMI with anicteric sclera  NECK:  no JVD, no bruit.  CHEST:  Symmetric and nontender.  LUNGS:  Normal respiratory effort, diminished at right base. No rales  HEART:  RR, occ irreg with normal S1 and S2, no S3. Soft ROSELYN USB, no diastolic muirmur  ABDOMEN: Soft, NT, ND without palpable organomegaly or bruits  EXTREMITIES:  Warm with good color, no clubbing or cyanosis.  There is 4+ edema below the knees, posterior edema in the thighsnoted.  MUSCULOSKELETAL: OA changes  NEURO/PSYCH:  Alert and oriented times three with approppriate behavior and responses. Nonfocal motor examination with normal gait and ambulation  Lymph: No significant palpable lymphadenopathy  Skin: no rash or lesions on exposed skin or reported.    Lab:     CBC:   Lab Results   Component Value Date    WBC 9.6 07/05/2022    RBC 4.45 07/05/2022    HGB 12.7 07/05/2022    HCT 39.9 07/05/2022     07/05/2022        CMP:    Lab Results   Component Value Date     07/05/2022    K 3.8 07/05/2022     07/05/2022    CO2 30 07/05/2022    BUN 25 (H) 07/05/2022    CREATININE 0.87 07/05/2022    GLUCOSE 99 07/05/2022    CALCIUM 9.2 07/05/2022       Magnesium:    Lab Results   Component Value Date    MG 2.40 07/05/2022       Lipid Profile:    Lab Results   Component Value Date    TRIG 280 (H) 04/05/2021    HDL 50.0 04/05/2021       TSH:    Lab Results   Component Value Date    TSH 25.41 (H)  "04/05/2021       BNP:   Lab Results   Component Value Date    BNP 97 07/05/2022        PT/INR:    Lab Results   Component Value Date    PROTIME 24.3 (H) 08/16/2023    INR 2.1 (H) 08/16/2023       HgBA1c:    Lab Results   Component Value Date    HGBA1C 5.4 01/04/2022       BMP:  Lab Results   Component Value Date     07/05/2022     08/11/2021     04/05/2021    K 3.8 07/05/2022    K 4.0 08/11/2021    K 4.3 04/05/2021     07/05/2022     08/11/2021    CL 99 04/05/2021    CO2 30 07/05/2022    CO2 31 08/11/2021    CO2 33 (H) 04/05/2021    BUN 25 (H) 07/05/2022    BUN 30 (H) 08/11/2021    BUN 29 (H) 04/05/2021    CREATININE 0.87 07/05/2022    CREATININE 1.37 (H) 08/11/2021    CREATININE 1.22 (H) 04/05/2021       Cardiac Enzymes:    No results found for: \"TROPHS\"    Hepatic Function Panel:    Lab Results   Component Value Date    ALKPHOS 82 07/05/2022    ALT 5 (L) 07/05/2022    AST 20 07/05/2022    PROT 6.6 07/05/2022    BILITOT 0.9 07/05/2022     Diagnostic Studies:     No echocardiogram results found for the past 12 months  Echos from [facility] reviewed - no evidence of endocarditis on valves - normal LV function  No nuclear medicine results found for the past 12 months    No valid procedures specified.    EKG:   No results found for: \"EKG\"    Radiology:     No orders to display     ASSESSMENT     Problem List Items Addressed This Visit       Benign hypertension with CKD (chronic kidney disease) stage III (CMS/HCC)    Relevant Medications    torsemide 60 mg tablet    Presence of cardiac pacemaker    Shortness of breath on exertion    S/P aortic valve replacement with bioprosthetic valve    (HFpEF) heart failure with preserved ejection fraction (CMS/HCC) - Primary    Relevant Orders    Basic Metabolic Panel    B-Type Natriuretic Peptide    S/P mitral valve replacement with bioprosthetic valve    Endocarditis of prosthetic mitral valve (CMS/HCC)    Former smoker       PLAN   HFpEF - would increase " diuretic therapy - needs compression with either ASHLEIGH hose or ACE warps to mobilize fluid. Would check LFTS - consider hepatic or abdominal scan to look for ascites. If does not respond to diuretics and compression stockings - would consider addition of SGLT2 inhibitor jardiance.   Atrial fibrillation / Pacemaker - per EP  Bioprosthetic Aortic and mitral valves - recent endocarditis last year - last cultures negative  Edema - increase diuretics as above - compression stockings. Consider evalution for ascites / liver disease.   CLL - per Oncology - patients family referred to oncology for questions of [place].     Consider changing to [facility] for outpatient management given her admissions and location close to [facility]. Will follow and support as needed. Check labs in 7-10 days of increased dose of diuretic therapy.

## 2024-02-26 NOTE — TELEPHONE ENCOUNTER
Pharmacy left voicemail questioning Torsemide that was just sent over today. It states take 60 mg every day, but then under notes to pharmacy, the directions say decrease to 20 mg daily. . Pharmacy also stated they received a script by Dr. Selby with take 2 20 mg tablets daily. Please advise on how patient should be taking Rx.

## 2024-03-08 ENCOUNTER — APPOINTMENT (OUTPATIENT)
Dept: RADIOLOGY | Facility: HOSPITAL | Age: 79
End: 2024-03-08
Payer: MEDICARE

## 2024-03-08 ENCOUNTER — HOSPITAL ENCOUNTER (EMERGENCY)
Facility: HOSPITAL | Age: 79
Discharge: HOME | End: 2024-03-09
Attending: STUDENT IN AN ORGANIZED HEALTH CARE EDUCATION/TRAINING PROGRAM
Payer: MEDICARE

## 2024-03-08 ENCOUNTER — APPOINTMENT (OUTPATIENT)
Dept: CARDIOLOGY | Facility: HOSPITAL | Age: 79
End: 2024-03-08
Payer: MEDICARE

## 2024-03-08 DIAGNOSIS — J18.9 PNEUMONIA OF BOTH LOWER LOBES DUE TO INFECTIOUS ORGANISM: Primary | ICD-10-CM

## 2024-03-08 LAB
ALBUMIN SERPL BCP-MCNC: 3.7 G/DL (ref 3.4–5)
ALP SERPL-CCNC: 68 U/L (ref 33–136)
ALT SERPL W P-5'-P-CCNC: 11 U/L (ref 7–45)
ANION GAP SERPL CALC-SCNC: 12 MMOL/L (ref 10–20)
APTT PPP: 38 SECONDS (ref 27–38)
AST SERPL W P-5'-P-CCNC: 22 U/L (ref 9–39)
BASOPHILS # BLD AUTO: 0.05 X10*3/UL (ref 0–0.1)
BASOPHILS NFR BLD AUTO: 0.4 %
BILIRUB SERPL-MCNC: 0.6 MG/DL (ref 0–1.2)
BNP SERPL-MCNC: 499 PG/ML (ref 0–99)
BUN SERPL-MCNC: 43 MG/DL (ref 6–23)
CALCIUM SERPL-MCNC: 8.9 MG/DL (ref 8.6–10.3)
CARDIAC TROPONIN I PNL SERPL HS: 24 NG/L (ref 0–13)
CARDIAC TROPONIN I PNL SERPL HS: 25 NG/L (ref 0–13)
CHLORIDE SERPL-SCNC: 103 MMOL/L (ref 98–107)
CO2 SERPL-SCNC: 30 MMOL/L (ref 21–32)
CREAT SERPL-MCNC: 1.48 MG/DL (ref 0.5–1.05)
EGFRCR SERPLBLD CKD-EPI 2021: 36 ML/MIN/1.73M*2
EOSINOPHIL # BLD AUTO: 0.1 X10*3/UL (ref 0–0.4)
EOSINOPHIL NFR BLD AUTO: 0.9 %
ERYTHROCYTE [DISTWIDTH] IN BLOOD BY AUTOMATED COUNT: 18.8 % (ref 11.5–14.5)
GLUCOSE SERPL-MCNC: 84 MG/DL (ref 74–99)
HCT VFR BLD AUTO: 36.5 % (ref 36–46)
HGB BLD-MCNC: 11.3 G/DL (ref 12–16)
IMM GRANULOCYTES # BLD AUTO: 0.02 X10*3/UL (ref 0–0.5)
IMM GRANULOCYTES NFR BLD AUTO: 0.2 % (ref 0–0.9)
INR PPP: 2.5 (ref 0.9–1.1)
LYMPHOCYTES # BLD AUTO: 7.09 X10*3/UL (ref 0.8–3)
LYMPHOCYTES NFR BLD AUTO: 63.6 %
MCH RBC QN AUTO: 28 PG (ref 26–34)
MCHC RBC AUTO-ENTMCNC: 31 G/DL (ref 32–36)
MCV RBC AUTO: 90 FL (ref 80–100)
MONOCYTES # BLD AUTO: 0.39 X10*3/UL (ref 0.05–0.8)
MONOCYTES NFR BLD AUTO: 3.5 %
NEUTROPHILS # BLD AUTO: 3.49 X10*3/UL (ref 1.6–5.5)
NEUTROPHILS NFR BLD AUTO: 31.4 %
NRBC BLD-RTO: 0 /100 WBCS (ref 0–0)
PLATELET # BLD AUTO: 173 X10*3/UL (ref 150–450)
POTASSIUM SERPL-SCNC: 4 MMOL/L (ref 3.5–5.3)
PROT SERPL-MCNC: 6.7 G/DL (ref 6.4–8.2)
PROTHROMBIN TIME: 28.1 SECONDS (ref 9.8–12.8)
RBC # BLD AUTO: 4.04 X10*6/UL (ref 4–5.2)
SODIUM SERPL-SCNC: 141 MMOL/L (ref 136–145)
WBC # BLD AUTO: 11.1 X10*3/UL (ref 4.4–11.3)

## 2024-03-08 PROCEDURE — 36415 COLL VENOUS BLD VENIPUNCTURE: CPT

## 2024-03-08 PROCEDURE — 2500000001 HC RX 250 WO HCPCS SELF ADMINISTERED DRUGS (ALT 637 FOR MEDICARE OP): Performed by: STUDENT IN AN ORGANIZED HEALTH CARE EDUCATION/TRAINING PROGRAM

## 2024-03-08 PROCEDURE — 84484 ASSAY OF TROPONIN QUANT: CPT

## 2024-03-08 PROCEDURE — 73610 X-RAY EXAM OF ANKLE: CPT | Mod: LT

## 2024-03-08 PROCEDURE — 85610 PROTHROMBIN TIME: CPT

## 2024-03-08 PROCEDURE — 93005 ELECTROCARDIOGRAM TRACING: CPT

## 2024-03-08 PROCEDURE — 99284 EMERGENCY DEPT VISIT MOD MDM: CPT | Performed by: STUDENT IN AN ORGANIZED HEALTH CARE EDUCATION/TRAINING PROGRAM

## 2024-03-08 PROCEDURE — 71046 X-RAY EXAM CHEST 2 VIEWS: CPT

## 2024-03-08 PROCEDURE — 71046 X-RAY EXAM CHEST 2 VIEWS: CPT | Performed by: RADIOLOGY

## 2024-03-08 PROCEDURE — 70450 CT HEAD/BRAIN W/O DYE: CPT

## 2024-03-08 PROCEDURE — 80053 COMPREHEN METABOLIC PANEL: CPT

## 2024-03-08 PROCEDURE — 73610 X-RAY EXAM OF ANKLE: CPT | Mod: LEFT SIDE | Performed by: RADIOLOGY

## 2024-03-08 PROCEDURE — 85025 COMPLETE CBC W/AUTO DIFF WBC: CPT

## 2024-03-08 PROCEDURE — 70450 CT HEAD/BRAIN W/O DYE: CPT | Performed by: STUDENT IN AN ORGANIZED HEALTH CARE EDUCATION/TRAINING PROGRAM

## 2024-03-08 PROCEDURE — 99285 EMERGENCY DEPT VISIT HI MDM: CPT | Mod: 25

## 2024-03-08 PROCEDURE — 2500000002 HC RX 250 W HCPCS SELF ADMINISTERED DRUGS (ALT 637 FOR MEDICARE OP, ALT 636 FOR OP/ED): Performed by: STUDENT IN AN ORGANIZED HEALTH CARE EDUCATION/TRAINING PROGRAM

## 2024-03-08 PROCEDURE — 83880 ASSAY OF NATRIURETIC PEPTIDE: CPT

## 2024-03-08 PROCEDURE — 85730 THROMBOPLASTIN TIME PARTIAL: CPT

## 2024-03-08 RX ORDER — CLINDAMYCIN HYDROCHLORIDE 300 MG/1
300 CAPSULE ORAL 3 TIMES DAILY
Qty: 30 CAPSULE | Refills: 0 | Status: SHIPPED | OUTPATIENT
Start: 2024-03-08 | End: 2024-03-18

## 2024-03-08 RX ORDER — CEFUROXIME AXETIL 250 MG/1
500 TABLET ORAL ONCE
Status: COMPLETED | OUTPATIENT
Start: 2024-03-08 | End: 2024-03-08

## 2024-03-08 RX ORDER — QUETIAPINE FUMARATE 25 MG/1
50 TABLET, FILM COATED ORAL ONCE
Status: COMPLETED | OUTPATIENT
Start: 2024-03-08 | End: 2024-03-08

## 2024-03-08 RX ORDER — CEFUROXIME AXETIL 500 MG/1
500 TABLET ORAL 2 TIMES DAILY
Qty: 20 TABLET | Refills: 0 | Status: SHIPPED | OUTPATIENT
Start: 2024-03-08 | End: 2024-03-18

## 2024-03-08 RX ORDER — CLINDAMYCIN HYDROCHLORIDE 150 MG/1
450 CAPSULE ORAL ONCE
Status: COMPLETED | OUTPATIENT
Start: 2024-03-08 | End: 2024-03-08

## 2024-03-08 RX ADMIN — CEFUROXIME AXETIL 500 MG: 250 TABLET, FILM COATED ORAL at 21:15

## 2024-03-08 RX ADMIN — CLINDAMYCIN HYDROCHLORIDE 450 MG: 150 CAPSULE ORAL at 21:14

## 2024-03-08 RX ADMIN — QUETIAPINE FUMARATE 50 MG: 25 TABLET ORAL at 21:15

## 2024-03-08 ASSESSMENT — PAIN SCALES - GENERAL: PAINLEVEL_OUTOF10: 4

## 2024-03-08 ASSESSMENT — PAIN - FUNCTIONAL ASSESSMENT: PAIN_FUNCTIONAL_ASSESSMENT: 0-10

## 2024-03-08 ASSESSMENT — LIFESTYLE VARIABLES
EVER HAD A DRINK FIRST THING IN THE MORNING TO STEADY YOUR NERVES TO GET RID OF A HANGOVER: NO
HAVE YOU EVER FELT YOU SHOULD CUT DOWN ON YOUR DRINKING: NO
HAVE PEOPLE ANNOYED YOU BY CRITICIZING YOUR DRINKING: NO
EVER FELT BAD OR GUILTY ABOUT YOUR DRINKING: NO

## 2024-03-08 ASSESSMENT — PAIN DESCRIPTION - PAIN TYPE: TYPE: ACUTE PAIN

## 2024-03-08 ASSESSMENT — PAIN DESCRIPTION - LOCATION: LOCATION: LEG

## 2024-03-08 ASSESSMENT — COLUMBIA-SUICIDE SEVERITY RATING SCALE - C-SSRS
2. HAVE YOU ACTUALLY HAD ANY THOUGHTS OF KILLING YOURSELF?: NO
6. HAVE YOU EVER DONE ANYTHING, STARTED TO DO ANYTHING, OR PREPARED TO DO ANYTHING TO END YOUR LIFE?: NO
1. IN THE PAST MONTH, HAVE YOU WISHED YOU WERE DEAD OR WISHED YOU COULD GO TO SLEEP AND NOT WAKE UP?: NO

## 2024-03-08 NOTE — ED PROVIDER NOTES
HPI   Chief Complaint   Patient presents with    Leg Swelling       Patient is a 78-year-old female with history of dementia, CKD, CAD, P A-fib on warfarin, TIA, status post aortic and mitral valve replacement presenting to [facility] ED for complaints of bilateral leg swelling, left ankle pain, and fall.  Patient denies any active chest pain, shortness of breath, nausea, vomiting, diarrhea, abdominal pain, fever, chills, headache, change in vision, or weakness.  Patient walks at baseline with a walker.  Patient is coming from assisted living facility.                          Dhruv Coma Scale Score: 15                     Patient History   Past Medical History:   Diagnosis Date    Personal history of other diseases of the circulatory system 01/05/2017    History of atrial fibrillation    Personal history of other diseases of the circulatory system 02/12/2015    History of sick sinus syndrome    Personal history of other diseases of the digestive system 12/14/2021    History of melena    Personal history of other diseases of the musculoskeletal system and connective tissue 01/05/2017    History of spinal stenosis    Personal history of other diseases of the musculoskeletal system and connective tissue     History of muscle pain    Personal history of other endocrine, nutritional and metabolic disease 01/05/2017    History of hypothyroidism    Personal history of other specified conditions     History of diarrhea    Personal history of other specified conditions     History of dizziness    Personal history of other specified conditions     History of fatigue    Personal history of other specified conditions     History of edema    Presence of cardiac pacemaker 08/06/2018    History of cardiac pacemaker    Unspecified abnormalities of gait and mobility 01/05/2017    Gait abnormality     Past Surgical History:   Procedure Laterality Date    BRONCHOSCOPY  11/21/2022    Bronchoscopy    OTHER SURGICAL HISTORY   10/13/2021    Complete colonoscopy    OTHER SURGICAL HISTORY  10/13/2021    Knee replacement    OTHER SURGICAL HISTORY  10/13/2021    Pacemaker insertion    OTHER SURGICAL HISTORY  10/13/2021    Wrist surgery    OTHER SURGICAL HISTORY  10/13/2021    Nerve block anesthesia     Family History   Problem Relation Name Age of Onset    Other (No pertinent family history) Mother      Other (Silent myocardial infarction) Father       Social History     Tobacco Use    Smoking status: Former     Types: Cigarettes     Quit date: 2009     Years since quitting: 15.1    Smokeless tobacco: Never   Substance Use Topics    Alcohol use: Not Currently    Drug use: Not Currently       Physical Exam   ED Triage Vitals [03/08/24 1706]   Temperature Heart Rate Respirations BP   36.6 °C (97.9 °F) 71 (!) 22 (!) 138/41      Pulse Ox Temp Source Heart Rate Source Patient Position   95 % Temporal -- Lying      BP Location FiO2 (%)     Right arm --       Physical Exam  Constitutional:       Appearance: Normal appearance. She is normal weight.   HENT:      Head: Normocephalic and atraumatic.      Nose: Nose normal.      Mouth/Throat:      Mouth: Mucous membranes are moist.      Pharynx: Oropharynx is clear.   Eyes:      Extraocular Movements: Extraocular movements intact.      Conjunctiva/sclera: Conjunctivae normal.      Pupils: Pupils are equal, round, and reactive to light.   Cardiovascular:      Rate and Rhythm: Normal rate and regular rhythm.      Pulses: Normal pulses.      Heart sounds: Normal heart sounds.   Pulmonary:      Effort: Pulmonary effort is normal.      Breath sounds: Normal breath sounds.   Abdominal:      General: Abdomen is flat. Bowel sounds are normal.      Palpations: Abdomen is soft.   Musculoskeletal:         General: Normal range of motion.      Cervical back: Normal range of motion and neck supple.      Right lower leg: Edema present.      Left lower leg: Edema present.      Comments: 2+ bilateral BLE edema.    Skin:     General: Skin is warm and dry.      Capillary Refill: Capillary refill takes less than 2 seconds.   Neurological:      General: No focal deficit present.      Mental Status: She is alert and oriented to person, place, and time. Mental status is at baseline.   Psychiatric:         Mood and Affect: Mood normal.         Behavior: Behavior normal.         ED Course & MDM   ED Course as of 03/08/24 1915   Fri Mar 08, 2024   1854 Had extensive conversation with the patient's medical decision maker and brother.  We discussed her recent hospitalization, we discussed her recent blood work and repeat blood work today, her recent imaging and repeat imaging today.  He recommended obtaining a CAT scan of her head in light of her new CT finding recently.  This was ordered.  We discussed the pros and cons of treating the atelectasis versus pneumonia versus aspiration on x-ray and he did opt for treatment.  We discussed the pros and cons of abx, he agrees.  We discussed that so long as the CAT scan of his the head is unchanged from previous and her labs upon review are all stable from previous that he feels comfortable with her returning home to the assisted living facility. [JK]      ED Course User Index  [JK] Richard Decker,          Diagnoses as of 03/08/24 1915   Pneumonia of both lower lobes due to infectious organism       Medical Decision Making  Patient is a 78 y.o. female who presents to [facility] ED for Leg Swelling. On initial ED evaluation, patient found to be in no acute distress. Per HPI, concern to evaluate and treat for possible CHF exacerbation, possible left ankle injury.  Obtaining cardiac labs/diagnostics, including BNP, EKG, troponin series, chest x-ray, troponin series.  Obtaining x-ray films of the left ankle.  Left ankle x-ray negative for any acute osseous abnormality.  Lab work remarkable for possible CATHY, creatinine of 1.48, BNP of 499.  Given patient dementia history, talked with POA  regarding treatment options and escalation of care as needed, POA amenable.  Obtaining CT head without contrast to evaluate for any acute intracranial abnormality.  Patient currently ambulating successfully with walker.  Head CT did show subacute left occipital infarct, however was present on CT imaging a week ago at outside hospital.  X-ray left ankle negative for any acute fracture or subluxation.  X-ray chest did show small bilateral pleural effusion, bibasilar patchy opacities likely atelectasis.  Likely pneumonia process.  Patient to be discharged on outpatient regimen of cefuroxime and clindamycin.    Patient vitally stable at this time.  Patient disposition, discharged back to facility according to POA wishes.    Rx given for cefuroxime, clindamycin. Patient to follow up with PCP outpatient. Anticipatory guidance and return precautions provided.  Patient otherwise stable for discharge.          Procedure  Procedures     Nic Garner MD  Resident  03/08/24 4910

## 2024-03-09 VITALS
OXYGEN SATURATION: 96 % | DIASTOLIC BLOOD PRESSURE: 71 MMHG | HEART RATE: 74 BPM | WEIGHT: 180 LBS | TEMPERATURE: 97.9 F | BODY MASS INDEX: 28.93 KG/M2 | SYSTOLIC BLOOD PRESSURE: 136 MMHG | RESPIRATION RATE: 16 BRPM | HEIGHT: 66 IN

## 2024-03-09 NOTE — DISCHARGE INSTRUCTIONS
Please return to closest ED if develop any chest pain, shortness of breath, dizziness, or instability.

## 2024-03-09 NOTE — ED NOTES
0896 Spoke to Yu Nurse from [facility] and said patient was sent there after 6 and she had said they don't accept patients after 6 because there is no nurse. I faxed over after visit summary and summary of care to       Libertad Sánchez, EMT  03/09/24 0840

## 2024-03-12 LAB
ATRIAL RATE: 73 BPM
PR INTERVAL: 256 MS
Q ONSET: 181 MS
QRS COUNT: 12 BEATS
QRS DURATION: 156 MS
QT INTERVAL: 466 MS
QTC CALCULATION(BAZETT): 513 MS
QTC FREDERICIA: 497 MS
R AXIS: -66 DEGREES
T AXIS: 18 DEGREES
T OFFSET: 414 MS
VENTRICULAR RATE: 73 BPM

## 2024-03-25 ENCOUNTER — APPOINTMENT (OUTPATIENT)
Dept: CARDIOLOGY | Facility: CLINIC | Age: 79
End: 2024-03-25
Payer: MEDICARE

## 2024-05-13 ENCOUNTER — APPOINTMENT (OUTPATIENT)
Dept: CARDIOLOGY | Facility: CLINIC | Age: 79
End: 2024-05-13
Payer: MEDICARE

## 2024-05-20 ENCOUNTER — HOSPITAL ENCOUNTER (OUTPATIENT)
Dept: CARDIOLOGY | Facility: HOSPITAL | Age: 79
Discharge: HOME | End: 2024-05-20
Payer: MEDICARE

## 2024-05-20 DIAGNOSIS — Z86.79 H/O SICK SINUS SYNDROME: ICD-10-CM

## 2024-05-20 PROCEDURE — 93294 REM INTERROG EVL PM/LDLS PM: CPT | Performed by: INTERNAL MEDICINE

## 2024-05-20 PROCEDURE — 93296 REM INTERROG EVL PM/IDS: CPT

## 2024-06-27 RX ORDER — TORSEMIDE 20 MG/1
TABLET ORAL
Qty: 90 TABLET | Refills: 0 | OUTPATIENT
Start: 2024-06-27

## 2024-07-16 RX ORDER — TORSEMIDE 20 MG/1
TABLET ORAL
Qty: 270 TABLET | Refills: 3 | OUTPATIENT
Start: 2024-07-16

## 2024-08-19 ENCOUNTER — APPOINTMENT (OUTPATIENT)
Dept: CARDIOLOGY | Facility: CLINIC | Age: 79
End: 2024-08-19
Payer: MEDICARE